# Patient Record
Sex: MALE | Race: WHITE | NOT HISPANIC OR LATINO | Employment: FULL TIME | URBAN - NONMETROPOLITAN AREA
[De-identification: names, ages, dates, MRNs, and addresses within clinical notes are randomized per-mention and may not be internally consistent; named-entity substitution may affect disease eponyms.]

---

## 2017-02-13 ENCOUNTER — OFFICE VISIT (OUTPATIENT)
Dept: RETAIL CLINIC | Facility: CLINIC | Age: 22
End: 2017-02-13

## 2017-02-13 VITALS
OXYGEN SATURATION: 99 % | BODY MASS INDEX: 25.74 KG/M2 | HEIGHT: 73 IN | RESPIRATION RATE: 16 BRPM | WEIGHT: 194.2 LBS | TEMPERATURE: 97.5 F | HEART RATE: 74 BPM | SYSTOLIC BLOOD PRESSURE: 122 MMHG | DIASTOLIC BLOOD PRESSURE: 80 MMHG

## 2017-02-13 DIAGNOSIS — J06.9 ACUTE URI: Primary | ICD-10-CM

## 2017-02-13 DIAGNOSIS — R19.7 ACUTE DIARRHEA: ICD-10-CM

## 2017-02-13 DIAGNOSIS — R07.89 RIGHT-SIDED CHEST WALL PAIN: ICD-10-CM

## 2017-02-13 PROCEDURE — 99203 OFFICE O/P NEW LOW 30 MIN: CPT | Performed by: NURSE PRACTITIONER

## 2017-02-13 RX ORDER — AZITHROMYCIN 250 MG/1
TABLET, FILM COATED ORAL
Qty: 6 TABLET | Refills: 0 | Status: SHIPPED | OUTPATIENT
Start: 2017-02-13 | End: 2022-01-09

## 2017-02-13 RX ORDER — IBUPROFEN 200 MG
200 TABLET ORAL AS NEEDED
COMMUNITY
End: 2022-01-09

## 2017-02-13 RX ORDER — ACETAMINOPHEN 325 MG/1
325 TABLET ORAL AS NEEDED
COMMUNITY
End: 2022-01-09

## 2017-02-13 NOTE — PROGRESS NOTES
Chief Complaint   Patient presents with   • Sore Throat     X 3 days   • Nausea   • Abdominal Pain     RUQ-had a fall this weekend   • Cough     Subjective   Robert Cordero is a 21 y.o. male who presents to the clinic today with complaints of sore throat and headache which started three days ago (Friday). He denies fever.  He also complains of nausea and cough which started Friday. He also complains of watery diarrhea (about 3-4 episodes through out the weekend.) He had mild shortness of breath Friday and Saturday, but none today.  He complains of RUQ abdominal pain and right chest pain which started after he fell on Saturday. He is a  and fell off a bull. He states all of his symptoms aside from the the pain started before he fell.  He denies bruising or trouble breathing.  He only feels the pain with certain movements. He denies pain with deep breath. He has been able to tolerate eating. He reports drinking plenty of fluids.  He is a smoker and has cut back. Not yet ready to quit completely.    The following portions of the patient's history were reviewed and updated as appropriate: allergies, past family history, past medical history, past social history, past surgical history and problem list.    Current Outpatient Prescriptions:   •  acetaminophen (TYLENOL) 325 MG tablet, Take 325 mg by mouth As Needed for mild pain (1-3)., Disp: , Rfl:   •  ibuprofen (ADVIL,MOTRIN) 200 MG tablet, Take 200 mg by mouth As Needed for mild pain (1-3)., Disp: , Rfl:   Allergies:  Shrimp (diagnostic)  History reviewed. No pertinent past medical history.  Past Surgical History   Procedure Laterality Date   • Tonsillectomy     • Toe surgery       History reviewed. No pertinent family history.  Social History   Substance Use Topics   • Smoking status: Current Every Day Smoker     Packs/day: 0.50     Years: 4.00     Types: Cigarettes   • Smokeless tobacco: Never Used   • Alcohol use No       Review of Systems  Review of  "Systems   Constitutional: Negative for chills, fatigue and fever.   HENT: Positive for postnasal drip and sore throat. Negative for ear pain.    Respiratory: Positive for cough (occasionally \"brownish\", no blood). Negative for shortness of breath and wheezing.    Cardiovascular: Positive for chest pain (right sided).   Gastrointestinal: Positive for abdominal pain, diarrhea and nausea. Negative for constipation and vomiting.   Neurological: Positive for light-headedness (occasional) and headaches. Negative for weakness.   Psychiatric/Behavioral: Negative for confusion.       Objective   Visit Vitals   • /80 (BP Location: Right arm, Patient Position: Sitting, Cuff Size: Adult)   • Pulse 74   • Temp 97.5 °F (36.4 °C) (Oral)   • Resp 16   • Ht 73\" (185.4 cm)   • Wt 194 lb 3.2 oz (88.1 kg)   • SpO2 99%   • BMI 25.62 kg/m2     Last 2 weights    02/13/17  0829   Weight: 194 lb 3.2 oz (88.1 kg)       Physical Exam   Constitutional: He is oriented to person, place, and time. Vital signs are normal. He appears well-developed. He is cooperative.  Non-toxic appearance. No distress.   HENT:   Head: Normocephalic and atraumatic.   Right Ear: Tympanic membrane, external ear and ear canal normal. Tympanic membrane is not perforated, not erythematous, not retracted and not bulging.   Left Ear: Tympanic membrane, external ear and ear canal normal. Tympanic membrane is not perforated, not erythematous, not retracted and not bulging.   Nose: Nose normal. Right sinus exhibits no maxillary sinus tenderness and no frontal sinus tenderness. Left sinus exhibits no maxillary sinus tenderness and no frontal sinus tenderness.   Mouth/Throat: Uvula is midline and mucous membranes are normal. Posterior oropharyngeal erythema: minimal erythema, clear post nasal drainage. Tonsils are 0 on the right. Tonsils are 0 on the left.   Eyes: Conjunctivae, EOM and lids are normal. Pupils are equal, round, and reactive to light.   Neck: Trachea " normal and normal range of motion. Neck supple. No rigidity.   Cardiovascular: Normal rate, regular rhythm, S1 normal, S2 normal and normal heart sounds.    Pulmonary/Chest: Effort normal and breath sounds normal. No respiratory distress. He has no decreased breath sounds. He has no wheezes. He has no rhonchi. He has no rales. Tenderness: right anterior mid chest tender to palpation, no defomity, crepitus, retraction, no swelling, no bruising.   Abdominal: Soft. Normal appearance and bowel sounds are normal. There is no tenderness. There is no rigidity, no rebound, no guarding and no CVA tenderness.   No abdominal bruising or signs of trauma   Lymphadenopathy:     He has no cervical adenopathy.   Neurological: He is alert and oriented to person, place, and time. Coordination and gait normal.   Skin: Skin is warm, dry and intact. No bruising, no ecchymosis, no laceration, no petechiae and no rash noted. No erythema.   Psychiatric: He has a normal mood and affect. His speech is normal and behavior is normal.       Assessment/Plan     Robert was seen today for sore throat, nausea, abdominal pain and cough.    Diagnoses and all orders for this visit:    Acute URI    Acute diarrhea    Right-sided chest wall pain    Other orders  -     azithromycin (ZITHROMAX Z-RAMON) 250 MG tablet; Take 2 tablets the first day, then 1 tablet daily for 4 days.      We have discussed you going to a higher level of care today for evaluation of your chest wall pain as xrays are not available at this facility.  You have refused stating you would not seek further evaluation and would go home today instead.  If you have any worsening pain or symptoms please go immediately to ER.     Take Imodium AD for diarrhea as per package instructions. Please increase your fluid intake.   If you have persistent diarrhea, dizziness, or persistent vomiting, abdominal pain or concern regarding dehydration please go to ER.     Smoking cessation encouraged.

## 2017-02-13 NOTE — PATIENT INSTRUCTIONS
"Upper Respiratory Infection, Adult  Most upper respiratory infections (URIs) are a viral infection of the air passages leading to the lungs. A URI affects the nose, throat, and upper air passages. The most common type of URI is nasopharyngitis and is typically referred to as \"the common cold.\"  URIs run their course and usually go away on their own. Most of the time, a URI does not require medical attention, but sometimes a bacterial infection in the upper airways can follow a viral infection. This is called a secondary infection. Sinus and middle ear infections are common types of secondary upper respiratory infections.  Bacterial pneumonia can also complicate a URI. A URI can worsen asthma and chronic obstructive pulmonary disease (COPD). Sometimes, these complications can require emergency medical care and may be life threatening.   CAUSES  Almost all URIs are caused by viruses. A virus is a type of germ and can spread from one person to another.   RISKS FACTORS  You may be at risk for a URI if:   · You smoke.    · You have chronic heart or lung disease.  · You have a weakened defense (immune) system.    · You are very young or very old.    · You have nasal allergies or asthma.  · You work in crowded or poorly ventilated areas.  · You work in health care facilities or schools.  SIGNS AND SYMPTOMS   Symptoms typically develop 2-3 days after you come in contact with a cold virus. Most viral URIs last 7-10 days. However, viral URIs from the influenza virus (flu virus) can last 14-18 days and are typically more severe. Symptoms may include:   · Runny or stuffy (congested) nose.    · Sneezing.    · Cough.    · Sore throat.    · Headache.    · Fatigue.    · Fever.    · Loss of appetite.    · Pain in your forehead, behind your eyes, and over your cheekbones (sinus pain).  · Muscle aches.    DIAGNOSIS   Your health care provider may diagnose a URI by:  · Physical exam.  · Tests to check that your symptoms are not due to " another condition such as:  ¨ Strep throat.  ¨ Sinusitis.  ¨ Pneumonia.  ¨ Asthma.  TREATMENT   A URI goes away on its own with time. It cannot be cured with medicines, but medicines may be prescribed or recommended to relieve symptoms. Medicines may help:  · Reduce your fever.  · Reduce your cough.  · Relieve nasal congestion.  HOME CARE INSTRUCTIONS   · Take medicines only as directed by your health care provider.    · Gargle warm saltwater or take cough drops to comfort your throat as directed by your health care provider.  · Use a warm mist humidifier or inhale steam from a shower to increase air moisture. This may make it easier to breathe.  · Drink enough fluid to keep your urine clear or pale yellow.    · Eat soups and other clear broths and maintain good nutrition.    · Rest as needed.    · Return to work when your temperature has returned to normal or as your health care provider advises. You may need to stay home longer to avoid infecting others. You can also use a face mask and careful hand washing to prevent spread of the virus.  · Increase the usage of your inhaler if you have asthma.    · Do not use any tobacco products, including cigarettes, chewing tobacco, or electronic cigarettes. If you need help quitting, ask your health care provider.  PREVENTION   The best way to protect yourself from getting a cold is to practice good hygiene.   · Avoid oral or hand contact with people with cold symptoms.    · Wash your hands often if contact occurs.    There is no clear evidence that vitamin C, vitamin E, echinacea, or exercise reduces the chance of developing a cold. However, it is always recommended to get plenty of rest, exercise, and practice good nutrition.   SEEK MEDICAL CARE IF:   · You are getting worse rather than better.    · Your symptoms are not controlled by medicine.    · You have chills.  · You have worsening shortness of breath.  · You have brown or red mucus.  · You have yellow or brown nasal  discharge.  · You have pain in your face, especially when you bend forward.  · You have a fever.  · You have swollen neck glands.  · You have pain while swallowing.  · You have white areas in the back of your throat.  SEEK IMMEDIATE MEDICAL CARE IF:   · You have severe or persistent:    Headache.    Ear pain.    Sinus pain.    Chest pain.  · You have chronic lung disease and any of the following:    Wheezing.    Prolonged cough.    Coughing up blood.    A change in your usual mucus.  · You have a stiff neck.  · You have changes in your:    Vision.    Hearing.    Thinking.    Mood.  MAKE SURE YOU:   · Understand these instructions.  · Will watch your condition.  · Will get help right away if you are not doing well or get worse.     This information is not intended to replace advice given to you by your health care provider. Make sure you discuss any questions you have with your health care provider.     Document Released: 06/13/2002 Document Revised: 05/03/2016 Document Reviewed: 03/25/2015    Diarrhea  Diarrhea is frequent loose and watery bowel movements. It can cause you to feel weak and dehydrated. Dehydration can cause you to become tired and thirsty, have a dry mouth, and have decreased urination that often is dark yellow. Diarrhea is a sign of another problem, most often an infection that will not last long. In most cases, diarrhea typically lasts 2-3 days. However, it can last longer if it is a sign of something more serious. It is important to treat your diarrhea as directed by your caregiver to lessen or prevent future episodes of diarrhea.  CAUSES   Some common causes include:  · Gastrointestinal infections caused by viruses, bacteria, or parasites.  · Food poisoning or food allergies.  · Certain medicines, such as antibiotics, chemotherapy, and laxatives.  · Artificial sweeteners and fructose.  · Digestive disorders.  HOME CARE INSTRUCTIONS  · Ensure adequate fluid intake (hydration): Have 1 cup (8 oz) of  fluid for each diarrhea episode. Avoid fluids that contain simple sugars or sports drinks, fruit juices, whole milk products, and sodas. Your urine should be clear or pale yellow if you are drinking enough fluids. Hydrate with an oral rehydration solution that you can purchase at pharmacies, retail stores, and online. You can prepare an oral rehydration solution at home by mixing the following ingredients together:    ?-? tsp table salt.    ¾ tsp baking soda.    ? tsp salt substitute containing potassium chloride.    1 ? tablespoons sugar.    1 L (34 oz) of water.  · Certain foods and beverages may increase the speed at which food moves through the gastrointestinal (GI) tract. These foods and beverages should be avoided and include:    Caffeinated and alcoholic beverages.    High-fiber foods, such as raw fruits and vegetables, nuts, seeds, and whole grain breads and cereals.    Foods and beverages sweetened with sugar alcohols, such as xylitol, sorbitol, and mannitol.  · Some foods may be well tolerated and may help thicken stool including:    Starchy foods, such as rice, toast, pasta, low-sugar cereal, oatmeal, grits, baked potatoes, crackers, and bagels.    Bananas.    Applesauce.  · Add probiotic-rich foods to help increase healthy bacteria in the GI tract, such as yogurt and fermented milk products.  · Wash your hands well after each diarrhea episode.  · Only take over-the-counter or prescription medicines as directed by your caregiver.  · Take a warm bath to relieve any burning or pain from frequent diarrhea episodes.  SEEK IMMEDIATE MEDICAL CARE IF:   · You are unable to keep fluids down.  · You have persistent vomiting.  · You have blood in your stool, or your stools are black and tarry.  · You do not urinate in 6-8 hours, or there is only a small amount of very dark urine.  · You have abdominal pain that increases or localizes.  · You have weakness, dizziness, confusion, or light-headedness.  · You have a  severe headache.  · Your diarrhea gets worse or does not get better.  · You have a fever or persistent symptoms for more than 2-3 days.  · You have a fever and your symptoms suddenly get worse.  MAKE SURE YOU:   · Understand these instructions.  · Will watch your condition.  · Will get help right away if you are not doing well or get worse.     This information is not intended to replace advice given to you by your health care provider. Make sure you discuss any questions you have with your health care provider.     Document Released: 12/08/2003 Document Revised: 01/08/2016 Document Reviewed: 08/25/2013  [employer] Interactive Patient Education ©2016 [employer]    [employer] Interactive Patient Education ©2016 [employer]      Chest Wall Pain  Chest wall pain is pain in or around the bones and muscles of your chest. Sometimes, an injury causes this pain. Sometimes, the cause may not be known. This pain may take several weeks or longer to get better.  HOME CARE INSTRUCTIONS   Pay attention to any changes in your symptoms. Take these actions to help with your pain:   · Rest as told by your health care provider.    · Avoid activities that cause pain. These include any activities that use your chest muscles or your abdominal and side muscles to lift heavy items.     · If directed, apply ice to the painful area:    Put ice in a plastic bag.    Place a towel between your skin and the bag.    Leave the ice on for 20 minutes, 2-3 times per day.  · Take over-the-counter and prescription medicines only as told by your health care provider.  · Do not use tobacco products, including cigarettes, chewing tobacco, and e-cigarettes. If you need help quitting, ask your health care provider.  · Keep all follow-up visits as told by your health care provider. This is important.  SEEK MEDICAL CARE IF:  · You have a fever.  · Your chest pain becomes worse.  · You have new symptoms.  SEEK IMMEDIATE MEDICAL CARE IF:  · You have nausea or  vomiting.  · You feel sweaty or light-headed.  · You have a cough with phlegm (sputum) or you cough up blood.  · You develop shortness of breath.     This information is not intended to replace advice given to you by your health care provider. Make sure you discuss any questions you have with your health care provider.         Document Released: 12/18/2006 Document Revised: 09/07/2016 Document Reviewed: 03/14/2016  [employer] Interactive Patient Education ©2016 [employer]    We have discussed you going to a higher level of care today for evaluation of your chest wall pain as xrays are not available at this facility.  You have refused stating you would not seek further evaluation and would go home today instead.  If you have any worsening pain or symptoms please go immediately to ER.     Take Imodium AD for diarrhea as per package instructions. Please increase your fluid intake.   If you have persistent diarrhea, dizziness, or persistent vomiting, abdominal pain or concern regarding dehydration please go to ER.     You have reported that you use cigarettes on a daily basis. This is dangerous to your health, and increases the risk of heart disease, stroke and respiratory problems. There are methods available to assist with smoking cessation, including medications and support groups. It is imperative that you consider stopping smoking to improve your overall health. Pt verbalized understanding.

## 2022-01-09 PROCEDURE — U0004 COV-19 TEST NON-CDC HGH THRU: HCPCS | Performed by: NURSE PRACTITIONER

## 2023-03-17 ENCOUNTER — OFFICE VISIT (OUTPATIENT)
Dept: FAMILY MEDICINE CLINIC | Facility: CLINIC | Age: 28
End: 2023-03-17
Payer: COMMERCIAL

## 2023-03-17 VITALS
OXYGEN SATURATION: 98 % | TEMPERATURE: 97.6 F | DIASTOLIC BLOOD PRESSURE: 86 MMHG | HEART RATE: 88 BPM | HEIGHT: 73 IN | BODY MASS INDEX: 32.23 KG/M2 | SYSTOLIC BLOOD PRESSURE: 146 MMHG | WEIGHT: 243.2 LBS

## 2023-03-17 DIAGNOSIS — R40.0 HAS DAYTIME DROWSINESS: Primary | ICD-10-CM

## 2023-03-17 DIAGNOSIS — R53.83 OTHER FATIGUE: ICD-10-CM

## 2023-03-17 DIAGNOSIS — E66.9 CLASS 1 OBESITY WITHOUT SERIOUS COMORBIDITY WITH BODY MASS INDEX (BMI) OF 32.0 TO 32.9 IN ADULT, UNSPECIFIED OBESITY TYPE: ICD-10-CM

## 2023-03-17 PROCEDURE — 99214 OFFICE O/P EST MOD 30 MIN: CPT | Performed by: NURSE PRACTITIONER

## 2023-03-17 NOTE — PROGRESS NOTES
Subjective   Chief Complaint:  Establish care.    History of Present Illness:  This 27 y.o. male was seen in the office today.    The patient reports he has been experiencing difficulty staying awake and focusing. He notes he falls asleep spontaneously during the day approximately 6 to 7 times a day at work. 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 The patient is unsure if he has ever been tested for narcolepsy. He adds he goes to bed on time. He states it does not matter if he sleeps four or seven hours, he still experiences this. He denies snoring or apneic episodes. The patient denies any facial abnormalities or broken nose. The patient adds he works every 10 hours, back and forth on swing shifts.    The patient reports he had blood work performed at [place]. He denies any depression or anxiety.    The patient states he injured his hand. He adds he has been applying wipes at work to avoid infection.    No Known Allergies   No current outpatient medications on file prior to visit.     No current facility-administered medications on file prior to visit.      Past Medical, Surgical, Social, and Family History:  History reviewed. No pertinent past medical history.  Past Surgical History:   Procedure Laterality Date   • TOE SURGERY     • TONSILLECTOMY       Social History     Socioeconomic History   • Marital status: Single   Tobacco Use   • Smoking status: Former     Packs/day: 0.50     Years: 4.00     Pack years: 2.00     Types: Cigarettes   • Smokeless tobacco: Current     Types: Chew   Vaping Use   • Vaping Use: Every day   Substance and Sexual Activity   • Alcohol use: No     History reviewed. No pertinent family history.    Objective   Physical Exam  Vitals and nursing note reviewed.   Constitutional:       General: He is not in acute distress.     Appearance: He is not diaphoretic.   HENT:      Head: Normocephalic and atraumatic.      Nose: Nose normal.   Eyes:      Conjunctiva/sclera:  "Conjunctivae normal.      Pupils: Pupils are equal, round, and reactive to light.   Neck:      Thyroid: No thyroid mass, thyromegaly or thyroid tenderness.      Vascular: No carotid bruit or JVD.      Trachea: No tracheal deviation.   Cardiovascular:      Rate and Rhythm: Normal rate and regular rhythm.      Pulses:           Dorsalis pedis pulses are 2+ on the right side and 2+ on the left side.        Posterior tibial pulses are 2+ on the right side and 2+ on the left side.      Heart sounds: Normal heart sounds. No murmur heard.    No friction rub. No gallop.   Pulmonary:      Effort: Pulmonary effort is normal. No respiratory distress.      Breath sounds: Normal breath sounds. No wheezing.   Abdominal:      General: Bowel sounds are normal.      Palpations: Abdomen is soft.      Tenderness: There is no abdominal tenderness. There is no guarding.   Musculoskeletal:         General: No tenderness or deformity. Normal range of motion.      Cervical back: Normal range of motion and neck supple. No tenderness.      Right lower leg: No edema.      Left lower leg: No edema.   Lymphadenopathy:      Cervical: No cervical adenopathy.   Skin:     General: Skin is warm and dry.      Capillary Refill: Capillary refill takes less than 2 seconds.   Neurological:      Mental Status: He is alert and oriented to person, place, and time.   Psychiatric:         Behavior: Behavior normal.         Thought Content: Thought content normal.         Judgment: Judgment normal.     /86 (BP Location: Left arm, Patient Position: Sitting) Comment: 1st bp - 148/86  Pulse 88   Temp 97.6 °F (36.4 °C) (Temporal)   Ht 185.4 cm (73\")   Wt 110 kg (243 lb 3.2 oz)   SpO2 98%   BMI 32.09 kg/m²     Prior Visit Notes/Records, Lab, Imaging, and Diagnostic Results Reviewed:  CBC:No results for input(s): WBC, HGB, HCT, PLT, IRONSERUM, IRON in the last 43112 hours.   Chemistry:No results for input(s): NA, K, CL, CO2, GLUCOSE, BUN, CREATININE, " EGFRIFNONA, EGFRIFAFRI, EGFRRESULT, CALCIUM in the last 03509 hours.  No results for input(s): ALT, AST, ALKPHOS, TOTAL in the last 07561 hours.    Invalid input(s): HEPATITSC    Sleep Apnea Discussion:    Symptoms patient is experiencing are excessive daytime sleepiness, nonrestorative sleep, awakenings.      Patient has been experiencing symptoms for greater than 6 months.    Pertinent negative symptoms include airway or nasal abnormalities, no obstructions, no snoring or witnessed apnea.    Y  N - Excessive Daytime Sleepiness   Y  N - Nasal Obstructions  Y  N - Maxillofacial Abnormalities  Y  N - Witnessed Apnea  Y  N - Dry Mouth  Y  N - Memory Loss  Y  N - Uncontrollable need to sleep  Y  N - Reflux  Y  N - Non-restorative Sleep  Y  N - Frequent awakenings  Y  N - Leg/Body jerks during sleep  Y  N - Nocturia (frequent urination at night)   Y  N - Unable to stay alseep  Y  N - Unable to fall asleep  Y  N - Nasal abnormalities  Y  N - Morning Headaches  Y  N - Uncontrollable muscle weakness  Y  N - Airway abnormalities  Y  N - Snores    Semora Sleepiness Scale    Situation Chance of Dozing or Sleeping   Sitting and reading 3   Watching TV 3   Sitting inactive in a public place 2   Being a passenger in a motor vehicle for an hour or more 3   Lying down in the afternoon 3   Sitting and talking to someone 1   Sitting quietly after lunch (no alcohol) 3   Stopped for a few minutes in traffic while driving 0   Total score (add the scores up) 18     0 = would never doze  1 = slight change to dozing  2 = moderate chance of dozing  3 = high chance of dozing     Assessment & Plan   Diagnoses and all orders for this visit:    1. Has daytime drowsiness (Primary)  -     Home Sleep Study; Future  -     CBC & Differential  -     Comprehensive Metabolic Panel  -     TSH  -     Lipid Panel  -     T4, Free  -     Vitamin B12 & Folate  -     Vitamin D,25-Hydroxy    2. Other fatigue  -     CBC & Differential  -     Comprehensive  Metabolic Panel  -     TSH  -     Lipid Panel  -     T4, Free  -     Vitamin B12 & Folate  -     Vitamin D,25-Hydroxy    3. Class 1 obesity without serious comorbidity with body mass index (BMI) of 32.0 to 32.9 in adult, unspecified obesity type    Discussion:  Advised and educated plan of care.      BMI is >= 30 and <35. (Class 1 Obesity). The following options were offered after discussion;: exercise counseling/recommendations and nutrition counseling/recommendations    Follow-up:  Return for follow-up as needed pending results - we will call.    Transcribed from ambient dictation for HORTENSIA Fajardo by Kathe Jean.  03/17/23   18:12 CDT    Patient or patient representative verbalized consent to the visit recording.  I have personally performed the services described in this document as transcribed by the above individual, and it is both accurate and complete.    Electronically signed by HORTENSIA Wang 03/17/23, 6:12 PM CDT.

## 2023-03-31 DIAGNOSIS — R40.0 HAS DAYTIME DROWSINESS: Primary | ICD-10-CM

## 2025-03-11 ENCOUNTER — OFFICE VISIT (OUTPATIENT)
Dept: INTERNAL MEDICINE | Facility: CLINIC | Age: 30
End: 2025-03-11
Payer: COMMERCIAL

## 2025-03-11 VITALS
SYSTOLIC BLOOD PRESSURE: 130 MMHG | DIASTOLIC BLOOD PRESSURE: 80 MMHG | HEIGHT: 73 IN | TEMPERATURE: 98.9 F | OXYGEN SATURATION: 96 % | WEIGHT: 228 LBS | BODY MASS INDEX: 30.22 KG/M2 | HEART RATE: 101 BPM

## 2025-03-11 DIAGNOSIS — R20.2 PARESTHESIAS: ICD-10-CM

## 2025-03-11 DIAGNOSIS — F41.9 ANXIETY AND DEPRESSION: Primary | ICD-10-CM

## 2025-03-11 DIAGNOSIS — N52.9 ERECTILE DYSFUNCTION, UNSPECIFIED ERECTILE DYSFUNCTION TYPE: ICD-10-CM

## 2025-03-11 DIAGNOSIS — F39 MOOD DISORDER: ICD-10-CM

## 2025-03-11 DIAGNOSIS — I10 ESSENTIAL HYPERTENSION: ICD-10-CM

## 2025-03-11 DIAGNOSIS — F32.A ANXIETY AND DEPRESSION: Primary | ICD-10-CM

## 2025-03-11 RX ORDER — BUPROPION HYDROCHLORIDE 150 MG/1
TABLET ORAL
COMMUNITY
Start: 2025-02-21

## 2025-03-11 NOTE — PROGRESS NOTES
Chief Complaint   Patient presents with    Establish Care         History:  Robert Cordero is a 29 y.o. male who presents today for evaluation of the above problems.      HPI  History of Present Illness  The patient presents for evaluation of anxiety, depression, erectile dysfunction, numbness in hands and feet, and elevated blood pressure.    He has been on Wellbutrin 150 mg for the past 2 weeks, prescribed through an online platform called [employer], but reports no significant improvement in his symptoms. He has not sought counseling or therapy. He describes his emotional state as highly variable, with minor events at work or personal life causing significant distress. He continues to consume alcohol, averaging 6 beers per session, typically on weekends.     He is currently taking testosterone supplements not prescribed to him, 500 mg per week, which he believes may be contributing to his emotional instability. This supplement was previously prescribed by [facility], but he discontinued it due to cost. His last lab work was conducted over a year ago. He reports significant weight loss since starting the testosterone supplements.     He was previously on lamotrigine once daily, which he discontinued due to erectile dysfunction side effects.    He reports experiencing numbness in his extremities multiple times throughout the day when he is sitting down, which he attributes to poor circulation. This numbness is so severe that he is unable to stand until it subsides. He also notes that his hands turn purple when he is sitting on the toilet leaning forward.      He has been on antihypertensive medication called lisinopril, which caused side effects of erectile dysfunction. He does not monitor his blood pressure at home but notes that it is consistently elevated during his clinic visits.    He reports erectile dysfunction. He has been on this medication for 2 weeks and reports no significant change in his symptoms. He  was previously on lamotrigine once daily, which he discontinued due to erectile dysfunction side effects.    SOCIAL HISTORY  The patient drinks alcohol, consuming about 6 beers a few times a week, typically on weekends.    MEDICATIONS  Current: Wellbutrin, testosterone (not prescribed)  Past: Lamotrigine    IMMUNIZATIONS  The patient did not receive a flu shot this year.    Social History     Socioeconomic History    Marital status: Single   Tobacco Use    Smoking status: Former     Current packs/day: 0.50     Average packs/day: 0.5 packs/day for 4.0 years (2.0 ttl pk-yrs)     Types: Cigarettes    Smokeless tobacco: Current     Types: Chew   Vaping Use    Vaping status: Every Day   Substance and Sexual Activity    Alcohol use: Yes     Alcohol/week: 18.0 standard drinks of alcohol     Types: 18 Cans of beer per week       PHQ-9 Depression Screening  Little interest or pleasure in doing things? Not at all   Feeling down, depressed, or hopeless? More than half the days   PHQ-2 Total Score 2   Trouble falling or staying asleep, or sleeping too much? Several days   Feeling tired or having little energy? Not at all   Poor appetite or overeating? Not at all   Feeling bad about yourself - or that you are a failure or have let yourself or your family down? More than half the days   Trouble concentrating on things, such as reading the newspaper or watching television? Not at all   Moving or speaking so slowly that other people could have noticed? Or the opposite - being so fidgety or restless that you have been moving around a lot more than usual? Several days     Thoughts that you would be better off dead, or of hurting yourself in some way? Not at all   PHQ-9 Total Score 6   If you checked off any problems, how difficult have these problems made it for you to do your work, take care of things at home, or get along with other people? Somewhat difficult       PHQ-9 Total Score: 6      ROS:  Review of Systems  See  "above    Current Outpatient Medications:     buPROPion XL (WELLBUTRIN XL) 150 MG 24 hr tablet, , Disp: , Rfl:     No results found for: \"GLUCOSE\", \"BUN\", \"CREATININE\", \"NA\", \"K\", \"CL\", \"CALCIUM\", \"PROTEINTOT\", \"ALBUMIN\", \"ALT\", \"AST\", \"ALKPHOS\", \"BILITOT\", \"GLOB\", \"AGRATIO\", \"BCR\", \"ANIONGAP\", \"EGFR\"    WBC   Date Value Ref Range Status   12/17/2023 7.1 4.8 - 10.8 K/uL Final     RBC   Date Value Ref Range Status   12/17/2023 5.13 4.70 - 6.10 M/uL Final     Hemoglobin   Date Value Ref Range Status   12/17/2023 15.3 14.0 - 18.0 g/dL Final     Hematocrit   Date Value Ref Range Status   12/17/2023 45.6 42.0 - 52.0 % Final     MCV   Date Value Ref Range Status   12/17/2023 88.9 80.0 - 94.0 fL Final     MCH   Date Value Ref Range Status   12/17/2023 29.8 27.0 - 31.0 pg Final     MCHC   Date Value Ref Range Status   12/17/2023 33.6 33.0 - 37.0 g/dL Final     RDW   Date Value Ref Range Status   12/17/2023 11.4 (L) 11.5 - 14.5 % Final     MPV   Date Value Ref Range Status   12/17/2023 10.2 9.4 - 12.4 fL Final     Platelets   Date Value Ref Range Status   12/17/2023 245 130 - 400 K/uL Final     Neutrophil Rel %   Date Value Ref Range Status   12/17/2023 59.4 50.0 - 65.0 % Final     Lymphocyte Rel %   Date Value Ref Range Status   12/17/2023 25.1 20.0 - 40.0 % Final     Monocyte Rel %   Date Value Ref Range Status   12/17/2023 11 (H) 0.0 - 10.0 % Final     Eosinophil Rel %   Date Value Ref Range Status   12/17/2023 3.5 0.0 - 5.0 % Final     Basophil Rel %   Date Value Ref Range Status   12/17/2023 0.6 0.0 - 1.0 % Final     Neutrophils Absolute   Date Value Ref Range Status   12/17/2023 4.2 1.5 - 7.5 K/uL Final     Lymphocytes Absolute   Date Value Ref Range Status   12/17/2023 1.8 1.1 - 4.5 K/uL Final     Monocytes Absolute   Date Value Ref Range Status   12/17/2023 0.8 0.00 - 0.90 K/uL Final     Eosinophils Absolute   Date Value Ref Range Status   12/17/2023 0.3 0.00 - 0.60 K/uL Final     Basophils Absolute   Date Value Ref " "Range Status   12/17/2023 0 0.00 - 0.20 K/uL Final     Immature Grans, Absolute   Date Value Ref Range Status   12/17/2023 0 K/uL Final         OBJECTIVE:  Visit Vitals  /80 (BP Location: Left arm, Patient Position: Sitting, Cuff Size: Adult)   Pulse 101   Temp 98.9 °F (37.2 °C) (Temporal)   Ht 185.4 cm (73\")   Wt 103 kg (228 lb)   SpO2 96%   BMI 30.08 kg/m²      Physical Exam  Vitals and nursing note reviewed.   Constitutional:       General: He is not in acute distress.     Appearance: He is well-developed. He is not ill-appearing or toxic-appearing.   HENT:      Head: Normocephalic and atraumatic.   Eyes:      Pupils: Pupils are equal, round, and reactive to light.   Neck:      Thyroid: No thyromegaly.      Trachea: Phonation normal.   Cardiovascular:      Rate and Rhythm: Normal rate and regular rhythm.      Pulses:           Radial pulses are 2+ on the right side and 2+ on the left side.        Posterior tibial pulses are 2+ on the right side and 2+ on the left side.      Heart sounds: No murmur heard.  Pulmonary:      Effort: No respiratory distress.   Skin:     Coloration: Skin is not pale.      Findings: No erythema.   Neurological:      Mental Status: He is alert.   Psychiatric:         Behavior: Behavior normal.         Thought Content: Thought content normal.         Judgment: Judgment normal.       Physical Exam  Lungs were auscultated.  Pulses are strong.    Vital Signs  Blood pressure is 130/80 today.      Assessment/Plan    Diagnoses and all orders for this visit:    1. Anxiety and depression (Primary)    2. Essential hypertension    3. Mood disorder    4. Erectile dysfunction, unspecified erectile dysfunction type    5. Paresthesias      Assessment & Plan  1. Anxiety and depression.  His depression screening score is 6, indicating a need for intervention. He has been on Wellbutrin 150 mg for 2 weeks without significant improvement. The potential impact of alcohol consumption on his ability to " manage situations was discussed. He consumes approximately 18 beers per week, which exceeds the recommended limit of 14. He was advised to continue Wellbutrin 150 mg and consider increasing the dose to 300 mg if symptoms persist. The possibility of consulting a behavioral health specialist was discussed, but he declined at this time. Blood work will be conducted during the next visit.    2. Erectile dysfunction.  He reports erectile dysfunction, which was related to side effect from lisinopril and lamotrigine.  He was advised to continue monitoring his symptoms and report any changes.    3. Numbness in hands and feet.  His blood pressure is within normal range, and his pulses are strong, suggesting that his symptoms may be due to nerve compression rather than circulatory issues. He was advised to avoid prolonged sitting and to change positions frequently to alleviate pressure on the nerves.    4. Elevated blood pressure.  His blood pressure reading today is 130/80, which is not high enough to warrant medication. He was advised to monitor his blood pressure at home and report any consistently high readings.    Follow-up  The patient will follow up in 4 to 6 weeks.    Return in about 6 weeks (around 4/22/2025) for Recheck anxiety, depression.    Patient was given instructions and counseling regarding his/her condition or for health maintenance advice. Please see specific information pulled into the AVS if appropriate.     CHAGO Calabrese MD  14:14 CDT  3/11/2025   Electronically signed    Patient or patient representative verbalized consent for the use of Ambient Listening during the visit with  SHARITA Calabrese MD for chart documentation. 3/11/2025  15:20 CDT